# Patient Record
(demographics unavailable — no encounter records)

---

## 2024-11-14 NOTE — PLAN
[FreeTextEntry1] : Will refer to GI for colonoscopy Advised monitoring his skin for any changes to current benign appearing skin lesions.

## 2024-11-14 NOTE — PHYSICAL EXAM
[No Acute Distress] : no acute distress [Well Nourished] : well nourished [Normal Oropharynx] : the oropharynx was normal [Normal TMs] : both tympanic membranes were normal [Thyroid Normal, No Nodules] : the thyroid was normal and there were no nodules present [Clear to Auscultation] : lungs were clear to auscultation bilaterally [No Edema] : there was no peripheral edema [Normal] : affect was normal and insight and judgment were intact [de-identified] : benign appearing lesion on the back

## 2024-11-14 NOTE — HISTORY OF PRESENT ILLNESS
[FreeTextEntry1] :  Establish care. c/o toenail fungus [de-identified] : Mr. ANJU TURCIOS is a 49 year old male here today to establish care.  Developed toenail fungus in most of his toes- podiatrist wants to rx antifungal pill. TdaP: 2014  UTD with Covid and flu Eye: 1 year ago

## 2024-11-14 NOTE — REVIEW OF SYSTEMS
[Nail Changes] : nail changes [Negative] : Psychiatric [Vision Problems] : no vision problems [de-identified] : one mole on back - no changes since noted

## 2024-11-14 NOTE — HEALTH RISK ASSESSMENT
[Excellent] : ~his/her~  mood as  excellent [No] : No [1 or 2 (0 pts)] : 1 or 2 (0 points) [Never (0 pts)] : Never (0 points) [No falls in past year] : Patient reported no falls in the past year [0] : 2) Feeling down, depressed, or hopeless: Not at all (0) [With Family] : lives with family [Employed] : employed [Feels Safe at Home] : Feels safe at home [Fully functional (bathing, dressing, toileting, transferring, walking, feeding)] : Fully functional (bathing, dressing, toileting, transferring, walking, feeding) [Fully functional (using the telephone, shopping, preparing meals, housekeeping, doing laundry, using] : Fully functional and needs no help or supervision to perform IADLs (using the telephone, shopping, preparing meals, housekeeping, doing laundry, using transportation, managing medications and managing finances) [Reports normal functional visual acuity (ie: able to read med bottle)] : Reports normal functional visual acuity [Never] : Never [NO] : No [Patient declined colonoscopy] : Patient declined colonoscopy [HIV test declined] : HIV test declined [Hepatitis C test offered] : Hepatitis C test offered [] :  [# Of Children ___] : has [unfilled] children [Smoke Detector] : smoke detector [Carbon Monoxide Detector] : carbon monoxide detector [Seat Belt] :  uses seat belt [Sunscreen] : uses sunscreen [Audit-CScore] : 0 [[id number]] : 0 [Change in mental status noted] : No change in mental status noted [Reports changes in hearing] : Reports no changes in hearing [Reports changes in vision] : Reports no changes in vision [Reports changes in dental health] : Reports no changes in dental health [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

## 2025-01-14 NOTE — REVIEW OF SYSTEMS
[Chills] : chills [Sore Throat] : sore throat [Cough] : cough [Fever] : no fever [Shortness Of Breath] : no shortness of breath [Wheezing] : no wheezing [FreeTextEntry4] : sinus congestion- less today.

## 2025-01-14 NOTE — HEALTH RISK ASSESSMENT
[No] : No [1 or 2 (0 pts)] : 1 or 2 (0 points) [Never (0 pts)] : Never (0 points) [No falls in past year] : Patient reported no falls in the past year [0] : 2) Feeling down, depressed, or hopeless: Not at all (0) [PHQ-2 Negative - No further assessment needed] : PHQ-2 Negative - No further assessment needed [Never] : Never [Audit-CScore] : 0 [[id number]] : 0

## 2025-01-14 NOTE — PHYSICAL EXAM
[No Acute Distress] : no acute distress [Well Nourished] : well nourished [Normal TMs] : both tympanic membranes were normal [No Lymphadenopathy] : no lymphadenopathy [Clear to Auscultation] : lungs were clear to auscultation bilaterally [Normal] : normal rate, regular rhythm, normal S1 and S2 and no murmur heard [de-identified] : Left nasal polyp (?); posterior pharynx is very injected

## 2025-01-14 NOTE — HISTORY OF PRESENT ILLNESS
[Severe] : severe [___ Weeks ago] :  [unfilled] weeks ago [Cough] : cough [Sore Throat] : sore throat [Chills] : chills [Fatigue] : fatigue [Headache] : headache [Activity] : with activity [At Night] : at night [In Morning] : in the morning [Worsening] : worsening [Congestion] : no congestion [Fever] : no fever [FreeTextEntry8] : Mr. ANJU TURCIOS is a 50 year old male here today for 3 week hx of cough, congestion and sore throat.  Feels very fatigued ( jetlagging as well) Mucus was greenish but less so today.  Kids are a little sick as well but wife is fine.  No fever/chills Throat is sore,  Had his flu shot.

## 2025-01-27 NOTE — HISTORY OF PRESENT ILLNESS
[Severe] : severe [___ Weeks ago] :  [unfilled] weeks ago [Constant] : constant [Congestion] : congestion [Cough] : cough [Sore Throat] : sore throat [Shortness Of Breath] : shortness of breath [Activity] : with activity [Worsening] : worsening [Wheezing] : no wheezing [Chills] : no chills [Anorexia] : no anorexia [Earache] : no earache [Fatigue] : not fatigue [Headache] : no headache [Fever] : no fever [FreeTextEntry8] : Mr. ANJU TURCIOS is a 50 year old male here today for 2 week hx of cough, congestion and sore throat.  Lost voice about one week ago. Slowly coming back. Majority of his current symptoms center around his throat. His voice is most affected but he also has been coughing up some "chunks " of sputum.

## 2025-01-27 NOTE — PHYSICAL EXAM
[No Acute Distress] : no acute distress [Well Nourished] : well nourished [Normal Oropharynx] : the oropharynx was normal [No Lymphadenopathy] : no lymphadenopathy [Clear to Auscultation] : lungs were clear to auscultation bilaterally [de-identified] : Hoarseness to the voice.

## 2025-01-27 NOTE — REVIEW OF SYSTEMS
[Hoarseness] : hoarseness [Earache] : no earache [Postnasal Drip] : no postnasal drip [Nasal Discharge] : no nasal discharge [Sore Throat] : no sore throat [Wheezing] : no wheezing [Cough] : no cough

## 2025-01-27 NOTE — HEALTH RISK ASSESSMENT
[No] : No [1 or 2 (0 pts)] : 1 or 2 (0 points) [Never (0 pts)] : Never (0 points) [No falls in past year] : Patient reported no falls in the past year [0] : 2) Feeling down, depressed, or hopeless: Not at all (0) [PHQ-2 Negative - No further assessment needed] : PHQ-2 Negative - No further assessment needed [Never] : Never [Audit-CScore] : 0 [[id number]] : 0

## 2025-02-25 NOTE — HISTORY OF PRESENT ILLNESS
[Cold Symptoms] : cold symptoms [Constant] : constant [Congestion] : congestion [Cough] : cough [Sore Throat] : sore throat [At Night] : at night [Wheezing] : no wheezing [Chills] : no chills [Anorexia] : no anorexia [Shortness Of Breath] : no shortness of breath [Earache] : no earache [Fatigue] : not fatigue [Headache] : no headache [Fever] : no fever [FreeTextEntry1] : 3 Months Ago [FreeTextEntry8] : Mr. ANJU TURCIOS is a 50 year old male here today for sore throat. Has been on and off for several months - worse at night. No hx of heartburn.  Needs hep A booster for [place] travel.

## 2025-02-25 NOTE — PHYSICAL EXAM
[No Acute Distress] : no acute distress [Well Nourished] : well nourished [Normal Oropharynx] : the oropharynx was normal [Normal Nasal Mucosa] : the nasal mucosa was normal [No Lymphadenopathy] : no lymphadenopathy